# Patient Record
Sex: MALE | Race: WHITE
[De-identification: names, ages, dates, MRNs, and addresses within clinical notes are randomized per-mention and may not be internally consistent; named-entity substitution may affect disease eponyms.]

---

## 2018-05-29 ENCOUNTER — HOSPITAL ENCOUNTER (OUTPATIENT)
Dept: HOSPITAL 17 - HSDC | Age: 48
Discharge: HOME | End: 2018-05-29
Attending: SURGERY
Payer: COMMERCIAL

## 2018-05-29 VITALS
TEMPERATURE: 97.6 F | RESPIRATION RATE: 16 BRPM | SYSTOLIC BLOOD PRESSURE: 127 MMHG | DIASTOLIC BLOOD PRESSURE: 79 MMHG | HEART RATE: 68 BPM | OXYGEN SATURATION: 99 %

## 2018-05-29 VITALS — HEIGHT: 69 IN | BODY MASS INDEX: 30.5 KG/M2 | WEIGHT: 205.91 LBS

## 2018-05-29 DIAGNOSIS — M54.5: ICD-10-CM

## 2018-05-29 DIAGNOSIS — R35.1: ICD-10-CM

## 2018-05-29 DIAGNOSIS — M19.90: ICD-10-CM

## 2018-05-29 DIAGNOSIS — D17.1: Primary | ICD-10-CM

## 2018-05-29 DIAGNOSIS — E66.9: ICD-10-CM

## 2018-05-29 DIAGNOSIS — M62.838: ICD-10-CM

## 2018-05-29 PROCEDURE — 11406 EXC TR-EXT B9+MARG >4.0 CM: CPT

## 2018-05-29 PROCEDURE — 88304 TISSUE EXAM BY PATHOLOGIST: CPT

## 2018-05-29 PROCEDURE — 00400 ANES INTEGUMENTARY SYS NOS: CPT

## 2018-05-29 NOTE — PD.OP
cc:   Matthew Arita MD


__________________________________________________





Operative Report


Date of Surgery:  May 29, 2018


Preoperative Diagnosis:  


(1) Lipoma of axilla


Postoperative Diagnosis:  


(1) Lipoma of axilla


Procedure:


Excision right axillary lipoma 6 cm


Anesthesia:


General


Surgeon:


Matthew Arita


Assistant(s):


Darwin


Operation and Findings:


EBL: 5 cc





Operative findings: Well-circumscribed 6 cm lipoma in the right posterior axilla





Procedure in detail: The patient was taken to the operating room placed in 

supine position.  General anesthesia was induced.  He was then placed laterally 

the left side down.  The right axilla was prepped and draped in usual sterile 

fashion.  Marcaine with epinephrine was injected in the skin and subcutaneous 

tissue and transverse incision made.  Subcutaneous tissue divided with 

electrocautery.  A well-circumscribed 6 cm lipoma was encountered.  This was 

primarily bluntly dissected from surrounding tissues and electrocautery used to 

fully remove it.  Hemostasis was achieved and the incision closed with deep 

dermal 3-0 Vicryl suture in subcuticular 4-0 Monocryl.  Dermabond was applied.  

The patient tolerated the procedure well and was extubated and taken to PACU in 

stable condition.











Matthew Arita MD May 29, 2018 13:28